# Patient Record
Sex: FEMALE
[De-identification: names, ages, dates, MRNs, and addresses within clinical notes are randomized per-mention and may not be internally consistent; named-entity substitution may affect disease eponyms.]

---

## 2021-03-18 ENCOUNTER — APPOINTMENT (OUTPATIENT)
Age: 41
End: 2021-03-18
Payer: COMMERCIAL

## 2021-03-18 PROCEDURE — 0001A: CPT

## 2022-03-17 ENCOUNTER — EMERGENCY (EMERGENCY)
Facility: HOSPITAL | Age: 42
LOS: 1 days | Discharge: HOME | End: 2022-03-17
Attending: STUDENT IN AN ORGANIZED HEALTH CARE EDUCATION/TRAINING PROGRAM
Payer: SELF-PAY

## 2022-03-17 VITALS
TEMPERATURE: 98 F | WEIGHT: 179.9 LBS | HEART RATE: 86 BPM | DIASTOLIC BLOOD PRESSURE: 70 MMHG | OXYGEN SATURATION: 98 % | SYSTOLIC BLOOD PRESSURE: 120 MMHG | RESPIRATION RATE: 18 BRPM | HEIGHT: 63 IN

## 2022-03-17 VITALS
HEART RATE: 75 BPM | SYSTOLIC BLOOD PRESSURE: 124 MMHG | TEMPERATURE: 99 F | DIASTOLIC BLOOD PRESSURE: 70 MMHG | OXYGEN SATURATION: 100 % | RESPIRATION RATE: 17 BRPM

## 2022-03-17 LAB
HCG UR QL: NEGATIVE — SIGNIFICANT CHANGE UP

## 2022-03-17 PROCEDURE — 99284 EMERGENCY DEPT VISIT MOD MDM: CPT | Mod: 25

## 2022-03-17 PROCEDURE — 72131 CT LUMBAR SPINE W/O DYE: CPT | Mod: 26,MA

## 2022-03-17 PROCEDURE — 72131 CT LUMBAR SPINE W/O DYE: CPT | Mod: MA

## 2022-03-17 PROCEDURE — 96372 THER/PROPH/DIAG INJ SC/IM: CPT

## 2022-03-17 PROCEDURE — 99284 EMERGENCY DEPT VISIT MOD MDM: CPT

## 2022-03-17 PROCEDURE — 81025 URINE PREGNANCY TEST: CPT

## 2022-03-17 RX ORDER — METHOCARBAMOL 500 MG/1
2 TABLET, FILM COATED ORAL
Qty: 18 | Refills: 0
Start: 2022-03-17 | End: 2022-03-19

## 2022-03-17 RX ORDER — METHOCARBAMOL 500 MG/1
1500 TABLET, FILM COATED ORAL ONCE
Refills: 0 | Status: COMPLETED | OUTPATIENT
Start: 2022-03-17 | End: 2022-03-17

## 2022-03-17 RX ORDER — IBUPROFEN 200 MG
1 TABLET ORAL
Qty: 20 | Refills: 0
Start: 2022-03-17 | End: 2022-03-21

## 2022-03-17 RX ORDER — KETOROLAC TROMETHAMINE 30 MG/ML
30 SYRINGE (ML) INJECTION ONCE
Refills: 0 | Status: DISCONTINUED | OUTPATIENT
Start: 2022-03-17 | End: 2022-03-17

## 2022-03-17 RX ORDER — OXYCODONE AND ACETAMINOPHEN 5; 325 MG/1; MG/1
1 TABLET ORAL ONCE
Refills: 0 | Status: DISCONTINUED | OUTPATIENT
Start: 2022-03-17 | End: 2022-03-17

## 2022-03-17 RX ADMIN — OXYCODONE AND ACETAMINOPHEN 1 TABLET(S): 5; 325 TABLET ORAL at 13:15

## 2022-03-17 RX ADMIN — Medication 30 MILLIGRAM(S): at 12:50

## 2022-03-17 RX ADMIN — OXYCODONE AND ACETAMINOPHEN 1 TABLET(S): 5; 325 TABLET ORAL at 14:11

## 2022-03-17 RX ADMIN — METHOCARBAMOL 1500 MILLIGRAM(S): 500 TABLET, FILM COATED ORAL at 11:49

## 2022-03-17 RX ADMIN — Medication 30 MILLIGRAM(S): at 11:50
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
## 2022-03-17 NOTE — ED PROVIDER NOTE - NS ED ATTENDING STATEMENT MOD
This was a shared visit with the GINO. I reviewed and verified the documentation and independently performed the documented:

## 2022-03-17 NOTE — ED PROVIDER NOTE - PATIENT PORTAL LINK FT
You can access the FollowMyHealth Patient Portal offered by [facility] by registering at the following website: [email or web address]. By joining [facility]’s FollowMyHealth portal, you will also be able to view your health information using other applications (apps) compatible with our system.

## 2022-03-17 NOTE — ED PROVIDER NOTE - MUSCULOSKELETAL, MLM
Back is symmetrical, scapulae are symmetric. Neck has full range of motion. No spinal tenderness, deformity or step-offs. Positive TY lower paraspinal tenderness and lower midline tenderness. All limbs equally strong 5+ bilaterally. Strong ankle dorsiflexion and plantar flexion against resistance bilaterally. Strong flexion/extension of big toe bilaterally. Pt is able to walk in straight line with steady gait. No recent weight loss or night sweats. No saddle anesthesia, no bowel/bladder incontinence or retention, no lower extremity weakness.

## 2022-03-17 NOTE — ED PROVIDER NOTE - PROGRESS NOTE DETAILS
Patient reports her headache has improved but no change in her lower back pain. CT negative. Will give percocet and reassess. Pain improved. Will dc home with PCP follow up. Pt is well appearing walking with steady gait, stable for discharge and follow up without fail with medical doctor. I had a detailed discussion with the patient and/or guardian regarding the historical points, exam findings, and any diagnostic results supporting the discharge diagnosis. Pt educated on care and need for follow up. Strict return instructions and red flag signs and symptoms discussed with patient. Questions answered. Pt shows understanding of discharge information and agrees to follow.

## 2022-03-17 NOTE — ED PROVIDER NOTE - CHPI ED SYMPTOMS NEG
No LE numbness, tingling, weakness, saddle anesthesia, fever, chills, IVDU, hx of cancer, bowel or bladder incontinence or any other concerning features./no loss of consciousness
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
## 2022-03-17 NOTE — ED PROVIDER NOTE - NSTIMEPROVIDERCAREINITIATE_GEN_ER
[facility] home monitoring called regarding patient. Inr taken today of 1.2. Please call patient with instructions.    17-Mar-2022 11:07
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
## 2022-03-17 NOTE — ED PROVIDER NOTE - NSFOLLOWUPINSTRUCTIONS_ED_ALL_ED_FT
You are going to be very sore for the next couple of days, this is normal.     Follow up with your primary care doctor within 4 days.     Medication sent to the pharmacy for you.     If you experience any new or worsening symptoms or if you are concerned you can always come back to the emergency for a re-evaluation.     Back Pain    Back pain can be scary. But even when the pain is severe, it usually goes away on its own within a few weeks. The cases that require urgent care or surgery are rare. Do not assume the worst. Almost everyone gets back pain at some point.     See your doctor or nurse if you have back pain and you:    -Recently had a fall or an injury to your back    -Have numbness or weakness in your legs    -Have problems with bladder or bowel control    -Have unexplained weight loss    -Have a fever or feel sick in other ways    -Take steroid medicine, such as prednisone, on a regular basis    -Have diabetes or a medical problem that weakens your immune system    -Have a history of cancer or osteoporosis    You should also see a doctor if:    -Your back pain is so severe that you cannot perform simple tasks    -Your back pain does not start to improve within 4 weeks    Many different things can cause low back pain. Most of the time, doctors do not know the exact cause.    Back pain can happen if you strain a muscle. This is often what has happened when a person "throws out" their back. This refers to pain that starts suddenly after physical activity, like lifting something heavy or bending the back.    Back pain can also happen if you have:    -Damaged, bulging, or torn discs    -Arthritis affecting the joints of the spine    -Bony growths on the vertebrae that crowd nearby nerves    -A vertebra out of place    -Narrowing in the spinal canal    -A tumor or infection (but this is very rare)    Most people with an episode of low back pain do not have a serious medical problem, and can try simple treatments such as:    -Staying active – The best thing you can do is to stay as active as possible. People with low back pain recover faster if they stay active. If your pain is severe, you might need to rest for a day or 2. But it's important to get back to walking and moving as soon as possible. While you should avoid heavy lifting and sports while your back hurts, try to keep doing your normal daily activities.    -Heat – Some people find that it helps to use a heating pad or heated wrap. Be careful to avoid high heat settings to prevent skin burns.    -Medicines – First, you can try pain medicines that you can get without a prescription. In many cases, doctors suggest first trying a nonsteroidal antiinflammatory drug, or "NSAID." NSAIDs include ibuprofen (sample brand names: Advil, Motrin) and naproxen (sample brand name: Aleve). These might work better than acetaminophen (Tylenol) for back pain.    -Treatments to help with symptoms – Some treatments might help you feel better for a little while. They include:    -Spinal manipulation – This is when a chiropractor, physical therapist, or other professional moves or "adjusts" the joints of your back. If you want to try this, talk to your doctor or nurse first.    -Acupuncture – This is when someone who knows traditional Chinese medicine inserts tiny needles into your body to block pain signals.    -Massage    While back pain usually goes away within a few weeks, some people do continue to have pain for longer. In this case, additional treatments might include:    -Self care – This involves being aware of your pain. While you should rest when you need to, it's important to stay active as much as you can. Things like applying heat and doing gentle stretches can help you feel better, too.    -Physical therapy – A physical therapist is an exercise expert who can teach you stretches and movements to help strengthen your muscles. The goal is to relieve pain but also help you get back to your normal activities. Exercises you can try include walking, swimming, or using an exercise bike. Some people also find that [employer] or yoga can help with their back pain. Finding activities you enjoy can help you stay active.    -Reducing stress – Some people find that it helps to try something called "mindfulness-based stress reduction." This involves going to a group program to practice relaxation and meditation. If your back pain is making you feel anxious or depressed, talk to your doctor or nurse. There are other treatments that can help with these problems.
